# Patient Record
Sex: MALE | Race: WHITE | NOT HISPANIC OR LATINO | URBAN - METROPOLITAN AREA
[De-identification: names, ages, dates, MRNs, and addresses within clinical notes are randomized per-mention and may not be internally consistent; named-entity substitution may affect disease eponyms.]

---

## 2021-07-14 ENCOUNTER — OUT OF OFFICE VISIT (OUTPATIENT)
Dept: URBAN - METROPOLITAN AREA MEDICAL CENTER 1 | Facility: MEDICAL CENTER | Age: 26
End: 2021-07-14
Payer: COMMERCIAL

## 2021-07-14 DIAGNOSIS — K20.80 ESOPHAGITIS DISSECANS SUPERFICIALIS: ICD-10-CM

## 2021-07-14 DIAGNOSIS — T18.128A FOOD IMPACTION OF ESOPHAGUS: ICD-10-CM

## 2021-07-14 PROCEDURE — 43239 EGD BIOPSY SINGLE/MULTIPLE: CPT | Performed by: INTERNAL MEDICINE

## 2021-07-14 PROCEDURE — 99285 EMERGENCY DEPT VISIT HI MDM: CPT | Performed by: INTERNAL MEDICINE

## 2021-08-11 ENCOUNTER — DASHBOARD ENCOUNTERS (OUTPATIENT)
Age: 26
End: 2021-08-11

## 2021-08-11 ENCOUNTER — OFFICE VISIT (OUTPATIENT)
Dept: URBAN - METROPOLITAN AREA CLINIC 78 | Facility: CLINIC | Age: 26
End: 2021-08-11
Payer: COMMERCIAL

## 2021-08-11 VITALS
DIASTOLIC BLOOD PRESSURE: 72 MMHG | BODY MASS INDEX: 25.21 KG/M2 | RESPIRATION RATE: 16 BRPM | SYSTOLIC BLOOD PRESSURE: 160 MMHG | WEIGHT: 160.6 LBS | HEART RATE: 54 BPM | HEIGHT: 67 IN | TEMPERATURE: 97.4 F

## 2021-08-11 DIAGNOSIS — R13.14 PHARYNGOESOPHAGEAL DYSPHAGIA: ICD-10-CM

## 2021-08-11 DIAGNOSIS — K20.90 ESOPHAGITIS DETERMINED BY ENDOSCOPY: ICD-10-CM

## 2021-08-11 DIAGNOSIS — T18.128D FOOD IMPACTION OF ESOPHAGUS, SUBSEQUENT ENCOUNTER: ICD-10-CM

## 2021-08-11 PROCEDURE — 99213 OFFICE O/P EST LOW 20 MIN: CPT | Performed by: INTERNAL MEDICINE

## 2021-08-11 RX ORDER — OMEPRAZOLE 40 MG/1
1 CAPSULE 30 MINUTES BEFORE MORNING MEAL CAPSULE, DELAYED RELEASE ORAL ONCE A DAY
Qty: 60 | Refills: 0 | OUTPATIENT
Start: 2021-08-11

## 2021-08-11 NOTE — HPI-TODAY'S VISIT:
Patient was referred by Dr. Addison Primary care  A copy of this document will be sent to the physician.  Patient was seen in [facility] for food bolus impaction. s/p EGD by Dr Vincent Delgadillo 7/14/21  Food bolus retrieval  Path : acute and chronic esophagitis 3-4 /HPF   He reports intermittent dysphagia need food dislodgement either by inducing vomiting or otherwise.Onset 2018. Episode 2-3 x year until the last episode.  Pastime it was cheddar rib eye sandwich from [facility] It never happened before where he needed to go to ER   Patient has been watching what he eats  He has been taking Omeprazole  Generally triggers are hot dogs, chicken,meats and one apple and breads  Denies Asthma, food allergy   Denies easy bruising or bleeding  Denies NSAID use

## 2021-09-13 ENCOUNTER — OFFICE VISIT (OUTPATIENT)
Dept: URBAN - METROPOLITAN AREA SURGERY CENTER 15 | Facility: SURGERY CENTER | Age: 26
End: 2021-09-13

## 2021-09-13 PROBLEM — 40739000: Status: ACTIVE | Noted: 2021-08-11

## 2021-09-14 ENCOUNTER — OFFICE VISIT (OUTPATIENT)
Dept: URBAN - METROPOLITAN AREA SURGERY CENTER 15 | Facility: SURGERY CENTER | Age: 26
End: 2021-09-14
Payer: COMMERCIAL

## 2021-09-14 DIAGNOSIS — R13.19 CERVICAL DYSPHAGIA: ICD-10-CM

## 2021-09-14 DIAGNOSIS — K20.0 ALLERGIC EOSINOPHILIC ESOPHAGITIS: ICD-10-CM

## 2021-09-14 DIAGNOSIS — K31.89 ACQUIRED DEFORMITY OF DUODENUM: ICD-10-CM

## 2021-09-14 PROCEDURE — G8907 PT DOC NO EVENTS ON DISCHARG: HCPCS | Performed by: INTERNAL MEDICINE

## 2021-09-14 PROCEDURE — 43249 ESOPH EGD DILATION <30 MM: CPT | Performed by: INTERNAL MEDICINE

## 2021-09-14 PROCEDURE — 43239 EGD BIOPSY SINGLE/MULTIPLE: CPT | Performed by: INTERNAL MEDICINE

## 2021-09-14 RX ORDER — OMEPRAZOLE 40 MG/1
1 CAPSULE 30 MINUTES BEFORE MORNING MEAL CAPSULE, DELAYED RELEASE ORAL ONCE A DAY
Qty: 60 | Refills: 0 | Status: ACTIVE | COMMUNITY
Start: 2021-08-11